# Patient Record
Sex: MALE | URBAN - METROPOLITAN AREA
[De-identification: names, ages, dates, MRNs, and addresses within clinical notes are randomized per-mention and may not be internally consistent; named-entity substitution may affect disease eponyms.]

---

## 2017-03-09 ENCOUNTER — MEDICAL CORRESPONDENCE (OUTPATIENT)
Dept: HEALTH INFORMATION MANAGEMENT | Facility: CLINIC | Age: 67
End: 2017-03-09

## 2017-03-09 ENCOUNTER — TRANSFERRED RECORDS (OUTPATIENT)
Dept: HEALTH INFORMATION MANAGEMENT | Facility: CLINIC | Age: 67
End: 2017-03-09

## 2017-03-13 ENCOUNTER — TRANSFERRED RECORDS (OUTPATIENT)
Dept: HEALTH INFORMATION MANAGEMENT | Facility: CLINIC | Age: 67
End: 2017-03-13

## 2017-03-15 ENCOUNTER — PRE VISIT (OUTPATIENT)
Dept: ORTHOPEDICS | Facility: CLINIC | Age: 67
End: 2017-03-15

## 2017-03-15 NOTE — TELEPHONE ENCOUNTER
1.  Date/reason for appt: 3/21/17 -- low back pain with right leg weakness  2.  Referring provider: Elvis Hughes  3.  Call to patient (Yes / No - short description): no, referred  4.  Previous care at / records requested from: [facility] -- faxed records request.

## 2017-03-15 NOTE — TELEPHONE ENCOUNTER
Records received from [facility]. Notified Brandi to pull image.   Included  Office notes: 3/9/17, 6/23/16   [facility] notes: 3/29/16, 1/20/16  Radiology reports: MR lumbar on 3/13/17    Missing/needed records: decompression cervical spine on 2/16/16, lumbar back surgery on 3/8/13

## 2017-03-16 NOTE — TELEPHONE ENCOUNTER
Per [facility], pt did not have surgery with them.     Called and spoke to pt, he remembers having low back surgery in 2000 at [facility] in [place].  Stated it was so long ago and doesn't quite remember clearly.  Pt asked that I fax GERMANIA form to his wife, Fern, at [phone].  He will sign the form and return back to me.

## 2017-03-20 NOTE — TELEPHONE ENCOUNTER
Records received from [facility].  Forwarded to clinic.  Included  Office notes: 7/18/14   Pain Clinic notes: 12/15/14, 2/16/15, 4/3/15, 8/3/15, 9/28/15, 12/4/15, 1/8/16, 2/5/16, 3/16/16, 5/11/16  Op/Procedure notes: Lumbar medial branch block L4-L5 done 6/23/15  Other: Labs 5/17/14  Missing/needed records: Op-notes for back surgery

## 2017-03-20 NOTE — TELEPHONE ENCOUNTER
Records received from [facility].   Included  Office notes: 6/23/10, 7/7/10, 8/4/10, 8/27/10, 9/27/10, 5/27/10, 8/15/14  ED notes: 5/7/14-5/10/14  Radiology reports: CT lumbar on 11/1/13   Lumbar on 12/14/01  Op/Procedure notes: L5 hemilaminectomy on 9/1999   Left foot realignment subtalar joint arthrodesis on 5/7/14 with implants

## 2017-03-20 NOTE — TELEPHONE ENCOUNTER
Received CD of imaging from , sent to film room.    OPS MBB Bilateral Lumbar 6/23/15  Pain Center Radiofrequency Ablation 11/5/14  Pain Center Medial Nerve Block 10/21/14, 10/7/14

## 2017-03-20 NOTE — TELEPHONE ENCOUNTER
Received VM from Ruthie at , stated they mailed CD via Fed-Ex on Friday and wanted to know if we received it.  Called Ruthie (at [phone]) and left a VM, we did receive CD but they didn't include XR or CT of Lspine, only injections.  Will need those images prior to appt.

## 2017-03-21 ENCOUNTER — OFFICE VISIT (OUTPATIENT)
Dept: ORTHOPEDICS | Facility: CLINIC | Age: 67
End: 2017-03-21

## 2017-03-21 VITALS — WEIGHT: 260 LBS | BODY MASS INDEX: 31.66 KG/M2 | HEIGHT: 76 IN

## 2017-03-21 DIAGNOSIS — M51.27 LUMBOSACRAL DISC HERNIATION: ICD-10-CM

## 2017-03-21 DIAGNOSIS — G89.29 CHRONIC RADICULAR LUMBAR PAIN: Primary | ICD-10-CM

## 2017-03-21 DIAGNOSIS — M54.16 CHRONIC RADICULAR LUMBAR PAIN: Primary | ICD-10-CM

## 2017-03-21 ASSESSMENT — ENCOUNTER SYMPTOMS
MYALGIAS: 1
JOINT SWELLING: 0
SKIN CHANGES: 0
ALTERED TEMPERATURE REGULATION: 0
WEIGHT LOSS: 0
POOR WOUND HEALING: 0
FEVER: 0
CHILLS: 0
HALLUCINATIONS: 0
BACK PAIN: 1
NECK PAIN: 1
FATIGUE: 1
STIFFNESS: 1
NAIL CHANGES: 0
WEIGHT GAIN: 1
POLYPHAGIA: 0
NIGHT SWEATS: 1
INCREASED ENERGY: 0
DECREASED APPETITE: 0
MUSCLE WEAKNESS: 0
ARTHRALGIAS: 0
POLYDIPSIA: 0
MUSCLE CRAMPS: 0

## 2017-03-21 NOTE — NURSING NOTE
"Reason For Visit:   Chief Complaint   Patient presents with     Musculoskeletal Problem     Chronic low back pain, recent acute pain from cruise 3 weeks ago, referred by PCP Dr Mckeon, MRI in PACs     Age: 66 year old    Occupation: Retired  Currently working? No, former .    Date of injury: Acute event recently while on cruise    Date of surgery: 2001 - laminectomy    Smoker: No    Pain Assessment  Patient Currently in Pain: Yes  Primary Pain Location: Back  Alleviating Factors: Exercise (Tries to go to gym often, enjoys biking/basketball)  Aggravating Factors: Sitting, Lying    Ht 1.93 m (6' 4\")  Wt 117.9 kg (260 lb)  BMI 31.65 kg/m2                                                   "

## 2017-03-21 NOTE — PROGRESS NOTES
HISTORY OF PRESENT ILLNESS  Mr. Soria is a pleasant 66 year old year old male who presents to clinic today with low back pain for 17 years  Had back surgery laminectomy in 2001 that helped but has gotten worse again.   Has had radiofrequency ablation done about 4 times, most recently in Jan. Of this year.     Location: low back, bilaterally  Quality:  achy pain    Severity: 10/10 at worst    Duration: 'years'  Timing: occurs daily  Context: occurs while sitting and standing for long periods  Modifying factors:  resting and non-use makes it better, movement and use makes it worse  Associated signs & symptoms: no radiation into his legs, but feels some weakness in right leg at times    Previous similar pain: yes  Exercise: lifting weights and biking  Additional history: as documented    MEDICAL HISTORY  Patient Active Problem List   Diagnosis     Nausea without vomiting       Current Outpatient Prescriptions   Medication Sig Dispense Refill     GABAPENTIN 800 mg 3xqd       GEMFIBROZIL 600 mg 2xqd       HYDROCHLOROTHIAZIDE PO Take  by mouth. 25 mg 1xqd       Valsartan (DIOVAN PO) Take  by mouth. 160 mg 1xqd       SIMVASTATIN PO Take  by mouth. 80 mg 1xqd       ALLOPURINOL 100 mg        ASPIRIN 81 mg        FISH OIL 1400 mg        ALPHA-LIPOIC ACID 200 mg 3 cap per day       Multiple Vitamins-Minerals (B COMPLEX PLUS VITAMIN C PO) Take  by mouth. 1 per day       Cholecalciferol (VITAMIN D PO) Take  by mouth. 1 per day       IBUPROFEN 800 mg prn         No Known Allergies    No family history on file.    Additional medical/Social/Surgical histories reviewed in [facility] and updated as appropriate.     REVIEW OF SYSTEMS (3/21/2017)  10 point ROS of systems including Constitutional, Eyes, Respiratory, Cardiovascular, Gastroenterology, Genitourinary, Integumentary, Musculoskeletal, Psychiatric were all negative except for pertinent positives noted in my HPI.     PHYSICAL EXAM  Vitals:    03/21/17 1416   Weight: 117.9 kg (260 lb)  "  Height: 1.93 m (6' 4\")     Vital Signs: Ht 1.93 m (6' 4\")  Wt 117.9 kg (260 lb)  BMI 31.65 kg/m2 Patient declined being weighed. Body mass index is 31.65 kg/(m^2).    General  - normal appearance, in no obvious distress  CV  - normal peripheral perfusion  Pulm  - normal respiratory pattern, non-labored  Musculoskeletal - lumbar spine  - stance: normal gait without limp, no obvious leg length discrepancy, normal heel and toe walk  - inspection: normal bone and joint alignment, no obvious scoliosis  - palpation: no paravertebral or bony tenderness  - ROM: flexion exacerbates pain in right low back, normal extension, sidebending, rotation  - strength: lower extremities 5/5 in all planes  - special tests:  (+) straight leg raise- right low back  (+) slump test- low back  Neuro  - patellar and Achilles DTRs 2+ bilaterally, right lower extremity sensory deficit throughout L5 distribution, grossly normal coordination, normal muscle tone  Skin  - no ecchymosis, erythema, warmth, or induration, no obvious rash  Psych  - interactive, appropriate, normal mood and affect    ASSESSMENT & PLAN  67 yo male with low back pain that radiates at times into right leg due to lumbar disc herniation and DDD  -due to history of surgeries and ablations patient desires a surgical opinion- referred to prieto  -orderd a lumbar JEFFREY at L5/s1  -discussed start of tizanadine and tylenol  Restart HEP from PT  Cont. Exercising  F/u after JEFFREY    Ozzy Du MD, CAQSM    "

## 2017-03-21 NOTE — TELEPHONE ENCOUNTER
Called [facility] and spoke to Erin, they no longer have CT Lspine 11/1/13 or XR Lspine 12/14/01.  They do have an XR Lspine from 2013. She will put image on CD.  Sent  to .

## 2017-03-21 NOTE — MR AVS SNAPSHOT
After Visit Summary   3/21/2017    Silas Soria    MRN: [id number]           Patient Information     Date Of Birth          1950        Visit Information        Provider Department      3/21/2017 2:00 PM Ozzy Du MD [facility]        Today's Diagnoses     Chronic radicular lumbar pain    -  1    Lumbosacral disc herniation           Follow-ups after your visit        Additional Services     SPINE SURGERY REFERRAL       Dr Perea                  Future tests that were ordered for you today     Open Future Orders        Priority Expected Expires Ordered    X-ray Lumbar epidural injection Routine 3/21/2017 3/21/2018 3/21/2017            Who to contact     Please call your clinic at [phone] to:    Ask questions about your health    Make or cancel appointments    Discuss your medicines    Learn about your test results    Speak to your doctor   If you have compliments or concerns about an experience at your clinic, or if you wish to file a complaint, please contact [facility] Patient Relations at [phone] or email us at [email or web address]         Additional Information About Your Visit        MyChart Information     [employer] is an electronic gateway that provides easy, online access to your medical records. With [employer], you can request a clinic appointment, read your test results, renew a prescription or communicate with your care team.     To sign up for [employer] visit the website at [email or web address]   You will be asked to enter the access code listed below, as well as some personal information. Please follow the directions to create your username and password.     Your access code is: [email or web address]  Expires: 2017  6:30 AM     Your access code will  in 90 days. If you need help or a new code, please contact your [facility] Clinic or call [phone] for assistance.        Care  "EveryWhere ID     This is your Care EveryWhere ID. This could be used by other organizations to access your [facility] medical records  [id number]        Your Vitals Were     Height BMI (Body Mass Index)                1.93 m (6' 4\") 31.65 kg/m2           Blood Pressure from Last 3 Encounters:   09/18/12 115/76   09/12/12 142/107   08/22/12 133/84    Weight from Last 3 Encounters:   03/21/17 117.9 kg (260 lb)   09/18/12 110.2 kg (243 lb)   09/12/12 106.6 kg (235 lb)              We Performed the Following     SPINE SURGERY REFERRAL          Today's Medication Changes          These changes are accurate as of: 3/21/17  2:42 PM.  If you have any questions, ask your nurse or doctor.               Start taking these medicines.        Dose/Directions    tiZANidine 4 MG tablet   Commonly known as:  ZANAFLEX   Used for:  Chronic radicular lumbar pain        Dose:  4-8 mg   Take 1-2 tablets (4-8 mg) by mouth nightly as needed   Quantity:  30 tablet   Refills:  1         These medicines have changed or have updated prescriptions.        Dose/Directions    ALLOPURINOL   This may have changed:  Another medication with the same name was removed. Continue taking this medication, and follow the directions you see here.        100 mg   Refills:  0         Stop taking these medicines if you haven't already. Please contact your care team if you have questions.     GLIPIZIDE PO           OMEPRAZOLE PO           ondansetron 8 MG tablet   Commonly known as:  ZOFRAN           ONGLYZA 5 MG Tabs tablet   Generic drug:  saxagliptin           oxyCODONE-acetaminophen 5-325 MG per tablet   Commonly known as:  PERCOCET                Where to get your medicines      These medications were sent to [facility] [address] [place] [address] [address] [address]     Phone:  [phone]     tiZANidine 4 MG tablet                Primary Care Provider    Physician No Ref-Primary       No address on file        Thank " you!     Thank you for choosing [facility]  for your care. Our goal is always to provide you with excellent care. Hearing back from our patients is one way we can continue to improve our services. Please take a few minutes to complete the written survey that you may receive in the mail after your visit with us. Thank you!             Your Updated Medication List - Protect others around you: Learn how to safely use, store and throw away your medicines at www.disposemymeds.org.          This list is accurate as of: 3/21/17  2:42 PM.  Always use your most recent med list.                   Brand Name Dispense Instructions for use    ALLOPURINOL      100 mg       ALPHA-LIPOIC ACID      200 mg 3 cap per day       ASPIRIN      81 mg       B COMPLEX PLUS VITAMIN C PO      Take  by mouth. 1 per day       DIOVAN PO      Take  by mouth. 160 mg 1xqd       FISH OIL      1400 mg       GABAPENTIN      800 mg 3xqd       GEMFIBROZIL      600 mg 2xqd       HYDROCHLOROTHIAZIDE PO      Take  by mouth. 25 mg 1xqd       IBUPROFEN      800 mg prn       SIMVASTATIN PO      Take  by mouth. 80 mg 1xqd       tiZANidine 4 MG tablet    ZANAFLEX    30 tablet    Take 1-2 tablets (4-8 mg) by mouth nightly as needed       VITAMIN D PO      Take  by mouth. 1 per day

## 2017-03-21 NOTE — LETTER
3/21/2017       RE: Silas Soria  [address] [facility] [address]     Dear Colleague,    Thank you for referring your patient, Silas Soria, to the [facility] at [facility]. Please see a copy of my visit note below.    HISTORY OF PRESENT ILLNESS  Mr. Soria is a pleasant 66 year old year old male who presents to clinic today with low back pain for 17 years  Had back surgery laminectomy in 2001 that helped but has gotten worse again.   Has had radiofrequency ablation done about 4 times, most recently in Jan. Of this year.     Location: low back, bilaterally  Quality:  achy pain    Severity: 10/10 at worst    Duration: 'years'  Timing: occurs daily  Context: occurs while sitting and standing for long periods  Modifying factors:  resting and non-use makes it better, movement and use makes it worse  Associated signs & symptoms: no radiation into his legs, but feels some weakness in right leg at times    Previous similar pain: yes  Exercise: lifting weights and biking  Additional history: as documented    MEDICAL HISTORY  Patient Active Problem List   Diagnosis     Nausea without vomiting       Current Outpatient Prescriptions   Medication Sig Dispense Refill     GABAPENTIN 800 mg 3xqd       GEMFIBROZIL 600 mg 2xqd       HYDROCHLOROTHIAZIDE PO Take  by mouth. 25 mg 1xqd       Valsartan (DIOVAN PO) Take  by mouth. 160 mg 1xqd       SIMVASTATIN PO Take  by mouth. 80 mg 1xqd       ALLOPURINOL 100 mg        ASPIRIN 81 mg        FISH OIL 1400 mg        ALPHA-LIPOIC ACID 200 mg 3 cap per day       Multiple Vitamins-Minerals (B COMPLEX PLUS VITAMIN C PO) Take  by mouth. 1 per day       Cholecalciferol (VITAMIN D PO) Take  by mouth. 1 per day       IBUPROFEN 800 mg prn         No Known Allergies    No family history on file.    Additional medical/Social/Surgical histories reviewed in [facility] and updated as appropriate.     REVIEW OF SYSTEMS (3/21/2017)  10 point ROS of systems  "including Constitutional, Eyes, Respiratory, Cardiovascular, Gastroenterology, Genitourinary, Integumentary, Musculoskeletal, Psychiatric were all negative except for pertinent positives noted in my HPI.     PHYSICAL EXAM  Vitals:    03/21/17 1416   Weight: 117.9 kg (260 lb)   Height: 1.93 m (6' 4\")     Vital Signs: Ht 1.93 m (6' 4\")  Wt 117.9 kg (260 lb)  BMI 31.65 kg/m2 Patient declined being weighed. Body mass index is 31.65 kg/(m^2).    General  - normal appearance, in no obvious distress  CV  - normal peripheral perfusion  Pulm  - normal respiratory pattern, non-labored  Musculoskeletal - lumbar spine  - stance: normal gait without limp, no obvious leg length discrepancy, normal heel and toe walk  - inspection: normal bone and joint alignment, no obvious scoliosis  - palpation: no paravertebral or bony tenderness  - ROM: flexion exacerbates pain in right low back, normal extension, sidebending, rotation  - strength: lower extremities 5/5 in all planes  - special tests:  (+) straight leg raise- right low back  (+) slump test- low back  Neuro  - patellar and Achilles DTRs 2+ bilaterally, right lower extremity sensory deficit throughout L5 distribution, grossly normal coordination, normal muscle tone  Skin  - no ecchymosis, erythema, warmth, or induration, no obvious rash  Psych  - interactive, appropriate, normal mood and affect    ASSESSMENT & PLAN  67 yo male with low back pain that radiates at times into right leg due to lumbar disc herniation and DDD  -due to history of surgeries and ablations patient desires a surgical opinion- referred to prieto  -orderd a lumbar JEFFREY at L5/s1  -discussed start of tizanadine and tylenol  Restart HEP from PT  Cont. Exercising  F/u after JEFFREY    Ozzy Du MD, CAQSM      "

## 2017-03-22 ENCOUNTER — TELEPHONE (OUTPATIENT)
Dept: GENERAL RADIOLOGY | Facility: CLINIC | Age: 67
End: 2017-03-22

## 2017-06-14 ENCOUNTER — PRE VISIT (OUTPATIENT)
Dept: ORTHOPEDICS | Facility: CLINIC | Age: 67
End: 2017-06-14

## 2017-06-14 NOTE — TELEPHONE ENCOUNTER
1.  Date/reason for appt: 6/22/17 - Chronic Lumbar Pain  2.  Referring provider: Dr. Du  3.  Call to patient (Yes / No - short description): no, pt is referred  4.  Previous care at / records requested from:   - [facility] -- Records and XR L Spine 3/30/17 in [employer]   - [email or web address] received, will forward to clinic. Imaging in [facility].   - [facility] -- Records received, will forward to clinic, imaging in Pac[facility].

## 2017-06-16 NOTE — TELEPHONE ENCOUNTER
Spoke to pt -- he completed his most recent radiofrequency ablation at [facility]. Only completed one here.    All others were done at [facility].    Will fax request request to [facility] for additional records, and will fax request to [facility].

## 2017-06-16 NOTE — TELEPHONE ENCOUNTER
LVM for pt to see where radiofrequency ablation was done. There is one in Pacs from [facility] 11/5/14.

## 2017-06-19 NOTE — TELEPHONE ENCOUNTER
Records received from [facility] - will forward to clinic.    Progress notes: 7/19/16, 1/25/17  Other: Bilat L3/4/5 Radiofrequency Ablation 12/8/16 with Dr. Resendiz

## 2017-06-21 DIAGNOSIS — M54.9 BACK PAIN, UNSPECIFIED BACK LOCATION, UNSPECIFIED BACK PAIN LATERALITY, UNSPECIFIED CHRONICITY: Primary | ICD-10-CM

## 2017-06-22 ENCOUNTER — OFFICE VISIT (OUTPATIENT)
Dept: ORTHOPEDICS | Facility: CLINIC | Age: 67
End: 2017-06-22

## 2017-06-22 VITALS — BODY MASS INDEX: 32.16 KG/M2 | HEIGHT: 76 IN | WEIGHT: 264.1 LBS

## 2017-06-22 DIAGNOSIS — M51.369 DDD (DEGENERATIVE DISC DISEASE), LUMBAR: Primary | ICD-10-CM

## 2017-06-22 ASSESSMENT — ENCOUNTER SYMPTOMS
FEVER: 0
POLYPHAGIA: 0
ARTHRALGIAS: 0
MUSCLE WEAKNESS: 0
MUSCLE CRAMPS: 0
NECK PAIN: 1
CHILLS: 0
WEIGHT GAIN: 0
DECREASED APPETITE: 0
BACK PAIN: 1
FATIGUE: 1
WEIGHT LOSS: 0
STIFFNESS: 0
NIGHT SWEATS: 1
JOINT SWELLING: 0
MYALGIAS: 1
NUMBNESS: 0

## 2017-06-22 NOTE — PROGRESS NOTES
REASON FOR CONSULTATION: Consult (Chronic lumbar pain. Hx of L5 hemilaminectomy with lateral recess decompression and root decompression 1999. )     REFERRING PHYSICIAN: Ozzy Du     PRIMARY CARE PHYSICIAN: No Ref-Primary, Physician    HISTORY OF PRESENT ILLNESS: Silas Soria is a 66 year old male with h/o back surgery laminectomy in 1999 (Dr. Joy) who presents with low back pain for the past 17 years. Pain is described as achy but will be sharp at times with bending over or twisting. Alleviating factors include: resting, sleeping on side, and ibuprofen prn. Was on Percocet 7.5mg 3x qday since surgery until 3 months ago. Aggravating factors include: sitting and standing for long periods. Denies radiation in the legs. Patient had radiofrequency ablation ~4 times and this would sometimes alleviate a 'pressure sensation' for up to year. Has been to a chiropactor. Had a lower back injection Jan/Feb. This helped for ~2 months.      REVIEW OF SYSTEMS: A 12-point review of systems was completed and is negative except for otherwise noted above in the history of present illness.    Past medical, past surgical, social, family history, medications, and allergies reviewed on the orthopaedic surgery intake form which is scanned into the electronic record with pertinent positives commented on.    No Known Allergies    Past Medical History:   Diagnosis Date     Arthritis      Diabetes mellitus (H)      Hypertension        Past Surgical History:   Procedure Laterality Date     BACK SURGERY      herniated disk     COLONOSCOPY       ESOPHAGOSCOPY, GASTROSCOPY, DUODENOSCOPY (EGD), COMBINED  9/12/2012    Procedure: COMBINED ESOPHAGOSCOPY, GASTROSCOPY, DUODENOSCOPY (EGD), BIOPSY SINGLE OR MULTIPLE;;  Surgeon: Tita Phan MD;  Location:  GI     ORTHOPEDIC SURGERY      fingers, wrist, shoulder, ankle       No family history on file.    Social History   Substance Use Topics     Smoking status: Former Smoker      Quit date: 1989     Smokeless tobacco: Former User     Types: Chew     Alcohol use Yes      Comment: occ beer once a month       Current Outpatient Prescriptions   Medication     tiZANidine (ZANAFLEX) 4 MG tablet     GABAPENTIN     GEMFIBROZIL     HYDROCHLOROTHIAZIDE PO     Valsartan (DIOVAN PO)     SIMVASTATIN PO     ALLOPURINOL     ASPIRIN     FISH OIL     ALPHA-LIPOIC ACID     Multiple Vitamins-Minerals (B COMPLEX PLUS VITAMIN C PO)     Cholecalciferol (VITAMIN D PO)     IBUPROFEN     No current facility-administered medications for this visit.        PHYSICAL EXAM:   Constitutional - Patient is healthy, well-nourished and appears stated age.    BMI = 32    Respiratory - Patient is breathing normally and in no respiratory distress.   Skin - No suspicious rashes or lesions.   Psychiatric - Normal mood and affect.   Cardiovascular - Peripheral pulses are normal.   ENT - Hearing intact to the spoken word.   GI - No abdominal distention.   Musculoskeletal - Non-antalgic gait without use of assistive devices.                 Lumbar Spine:    Appearance - Normal     Palpation - TTP in lower lumbar spine    ROM - pain with lateral flexion. Full ROM to ext/flex.    Motor -        LOWER EXTREMITY Left Right   Hip flexion 5/5 5/5   Knee flexion 5/5 5/5   Knee extension 5/5 5/5   Ankle dorsiflexion 5/5 5/5   Ankle plantarflexion 5/5 5/5   Great toe extension 5/5 5/5   Great toe flexion 5/5 5/5         Neurologic - Sensation intact to light touch bilaterally      IMAGIN17 full spine 2v  Advanced spondylosis/DDD of L5-S1    CLINICAL ASSESSMENT:   1. 67 y/o M with history of laminectomy in  with XRs today consistent with advanced spondylosis/DDD of L5-S1.     DISCUSSION/PLAN:   Patient tried a wide variety of conservative treatment options, such as radiofrequency ablation, oral analgesics, and injections. Patient counseled on further conservative management (injections 2-3x/year) vs surgery (ALIF with  anterior fixation of L5-S1).    1. L5-S1 translaminar epidural injection  2. Patient will call clinic after injection if symptoms return for subsequent injection vs planning for surgery.     Patient was seen and discussed with Dr. Perea.  Assessment and plan developed by him.  All questions and concerns were answered to the patient's apparent satisfaction before leaving the clinic.     Tanya Barreto MD  [facility] Resident - PGY1  Orthopedic Surgery      Addendum:  I personally saw and evaluated patient with PGY-1 Estevan, and I agree with findings and plan outlined in the note.  66/m, with postlaminectomy spondylosis/DDD L5-S1, s/p laminectomy (Dr. Joy, 1999).  Would like to continue with nonop treatment for now.  - Ordered L5-S1 IL JEFFREY.  - In future, may consider surgery: ALIF with anterior fixation L5-S1.  RTC prn.  TT > 45 mins, > 50% CC.

## 2017-06-22 NOTE — LETTER
6/22/2017      RE: Silas Soria  [address] [facility] [address]       REASON FOR CONSULTATION: Consult (Chronic lumbar pain. Hx of L5 hemilaminectomy with lateral recess decompression and root decompression 1999. )     REFERRING PHYSICIAN: Ozzy Du     PRIMARY CARE PHYSICIAN: No Ref-Primary, Physician    HISTORY OF PRESENT ILLNESS: Silas Soria is a 66 year old male with h/o back surgery laminectomy in 1999 (Dr. Joy) who presents with low back pain for the past 17 years. Pain is described as achy but will be sharp at times with bending over or twisting. Alleviating factors include: resting, sleeping on side, and ibuprofen prn. Was on Percocet 7.5mg 3x qday since surgery until 3 months ago. Aggravating factors include: sitting and standing for long periods. Denies radiation in the legs. Patient had radiofrequency ablation ~4 times and this would sometimes alleviate a 'pressure sensation' for up to year. Has been to a chiropactor. Had a lower back injection Jan/Feb. This helped for ~2 months.      REVIEW OF SYSTEMS: A 12-point review of systems was completed and is negative except for otherwise noted above in the history of present illness.    Past medical, past surgical, social, family history, medications, and allergies reviewed on the orthopaedic surgery intake form which is scanned into the electronic record with pertinent positives commented on.    No Known Allergies    Past Medical History:   Diagnosis Date     Arthritis      Diabetes mellitus (H)      Hypertension        Past Surgical History:   Procedure Laterality Date     BACK SURGERY      herniated disk     COLONOSCOPY       ESOPHAGOSCOPY, GASTROSCOPY, DUODENOSCOPY (EGD), COMBINED  9/12/2012    Procedure: COMBINED ESOPHAGOSCOPY, GASTROSCOPY, DUODENOSCOPY (EGD), BIOPSY SINGLE OR MULTIPLE;;  Surgeon: Tita Phan MD;  Location:  GI     ORTHOPEDIC SURGERY      fingers, wrist, shoulder, ankle       No family history on  file.    Social History   Substance Use Topics     Smoking status: Former Smoker     Quit date: 1989     Smokeless tobacco: Former User     Types: Chew     Alcohol use Yes      Comment: occ beer once a month       Current Outpatient Prescriptions   Medication     tiZANidine (ZANAFLEX) 4 MG tablet     GABAPENTIN     GEMFIBROZIL     HYDROCHLOROTHIAZIDE PO     Valsartan (DIOVAN PO)     SIMVASTATIN PO     ALLOPURINOL     ASPIRIN     FISH OIL     ALPHA-LIPOIC ACID     Multiple Vitamins-Minerals (B COMPLEX PLUS VITAMIN C PO)     Cholecalciferol (VITAMIN D PO)     IBUPROFEN     No current facility-administered medications for this visit.        PHYSICAL EXAM:   Constitutional - Patient is healthy, well-nourished and appears stated age.    BMI = 32    Respiratory - Patient is breathing normally and in no respiratory distress.   Skin - No suspicious rashes or lesions.   Psychiatric - Normal mood and affect.   Cardiovascular - Peripheral pulses are normal.   ENT - Hearing intact to the spoken word.   GI - No abdominal distention.   Musculoskeletal - Non-antalgic gait without use of assistive devices.                 Lumbar Spine:    Appearance - Normal     Palpation - TTP in lower lumbar spine    ROM - pain with lateral flexion. Full ROM to ext/flex.    Motor -        LOWER EXTREMITY Left Right   Hip flexion 5/5 5/5   Knee flexion 5/5 5/5   Knee extension 5/5 5/5   Ankle dorsiflexion 5/5 5/5   Ankle plantarflexion 5/5 5/5   Great toe extension 5/5 5/5   Great toe flexion 5/5 5/5         Neurologic - Sensation intact to light touch bilaterally      IMAGIN17 full spine 2v  Advanced spondylosis/DDD of L5-S1    CLINICAL ASSESSMENT:   1. 67 y/o M with history of laminectomy in  with XRs today consistent with advanced spondylosis/DDD of L5-S1.     DISCUSSION/PLAN:   Patient tried a wide variety of conservative treatment options, such as radiofrequency ablation, oral analgesics, and injections. Patient counseled on  further conservative management (injections 2-3x/year) vs surgery (ALIF with anterior fixation of L5-S1).    1. L5-S1 translaminar epidural injection  2. Patient will call clinic after injection if symptoms return for subsequent injection vs planning for surgery.     Patient was seen and discussed with Dr. Perea.  Assessment and plan developed by him.  All questions and concerns were answered to the patient's apparent satisfaction before leaving the clinic.     Tanya Barreto MD  [facility] Resident - PGY1  Orthopedic Surgery      Addendum:  I personally saw and evaluated patient with PGY-1 Estevan, and I agree with findings and plan outlined in the note.  66/m, with postlaminectomy spondylosis/DDD L5-S1, s/p laminectomy (Dr. Joy, 1999).  Would like to continue with nonop treatment for now.  - Ordered L5-S1 IL JEFFREY.  - In future, may consider surgery: ALIF with anterior fixation L5-S1.  RTC prn.  TT > 45 mins, > 50% CC.    Slava Perea MD

## 2017-06-22 NOTE — NURSING NOTE
SAFETY INJECTION WORKSHEET    Epidural Steriod Injection: lumbar spine  Are you on blood thinners or platelet inhibitors? No  Are you a diabetic? Yes  Are you allergic to dye? Not allergic but had a reaction to his kidney function.   Are you currently on antibiotics? No  Weight? Wt Readings from Last 1 Encounters:  06/22/17 : 119.8 kg (264 lb 1.6 oz)    Have you had a flu shot within the last 10 days? No

## 2017-06-22 NOTE — LETTER
6/22/2017       RE: Silas Soria  [address] [facility] [address]     Dear Colleague,    Thank you for referring your patient, Silas Soria, to the [facility] at [facility]. Please see a copy of my visit note below.    REASON FOR CONSULTATION: Consult (Chronic lumbar pain. Hx of L5 hemilaminectomy with lateral recess decompression and root decompression 1999. )     REFERRING PHYSICIAN: Ozzy Du     PRIMARY CARE PHYSICIAN: No Ref-Primary, Physician    HISTORY OF PRESENT ILLNESS: Silas Soria is a 66 year old male with h/o back surgery laminectomy in 1999 (Dr. Joy) who presents with low back pain for the past 17 years. Pain is described as achy but will be sharp at times with bending over or twisting. Alleviating factors include: resting, sleeping on side, and ibuprofen prn. Was on Percocet 7.5mg 3x qday since surgery until 3 months ago. Aggravating factors include: sitting and standing for long periods. Denies radiation in the legs. Patient had radiofrequency ablation ~4 times and this would sometimes alleviate a 'pressure sensation' for up to year. Has been to a chiropactor. Had a lower back injection Jan/Feb. This helped for ~2 months.      REVIEW OF SYSTEMS: A 12-point review of systems was completed and is negative except for otherwise noted above in the history of present illness.    Past medical, past surgical, social, family history, medications, and allergies reviewed on the orthopaedic surgery intake form which is scanned into the electronic record with pertinent positives commented on.    No Known Allergies    Past Medical History:   Diagnosis Date     Arthritis      Diabetes mellitus (H)      Hypertension        Past Surgical History:   Procedure Laterality Date     BACK SURGERY      herniated disk     COLONOSCOPY       ESOPHAGOSCOPY, GASTROSCOPY, DUODENOSCOPY (EGD), COMBINED  9/12/2012    Procedure: COMBINED ESOPHAGOSCOPY, GASTROSCOPY,  DUODENOSCOPY (EGD), BIOPSY SINGLE OR MULTIPLE;;  Surgeon: Tita Phan MD;  Location:  GI     ORTHOPEDIC SURGERY      fingers, wrist, shoulder, ankle       No family history on file.    Social History   Substance Use Topics     Smoking status: Former Smoker     Quit date: 1989     Smokeless tobacco: Former User     Types: Chew     Alcohol use Yes      Comment: occ beer once a month       Current Outpatient Prescriptions   Medication     tiZANidine (ZANAFLEX) 4 MG tablet     GABAPENTIN     GEMFIBROZIL     HYDROCHLOROTHIAZIDE PO     Valsartan (DIOVAN PO)     SIMVASTATIN PO     ALLOPURINOL     ASPIRIN     FISH OIL     ALPHA-LIPOIC ACID     Multiple Vitamins-Minerals (B COMPLEX PLUS VITAMIN C PO)     Cholecalciferol (VITAMIN D PO)     IBUPROFEN     No current facility-administered medications for this visit.        PHYSICAL EXAM:   Constitutional - Patient is healthy, well-nourished and appears stated age.    BMI = 32    Respiratory - Patient is breathing normally and in no respiratory distress.   Skin - No suspicious rashes or lesions.   Psychiatric - Normal mood and affect.   Cardiovascular - Peripheral pulses are normal.   ENT - Hearing intact to the spoken word.   GI - No abdominal distention.   Musculoskeletal - Non-antalgic gait without use of assistive devices.                 Lumbar Spine:    Appearance - Normal     Palpation - TTP in lower lumbar spine    ROM - pain with lateral flexion. Full ROM to ext/flex.    Motor -        LOWER EXTREMITY Left Right   Hip flexion 5/5 5/5   Knee flexion 5/5 5/5   Knee extension 5/5 5/5   Ankle dorsiflexion 5/5 5/5   Ankle plantarflexion 5/5 5/5   Great toe extension 5/5 5/5   Great toe flexion 5/5 5/5       Neurologic - Sensation intact to light touch bilaterally      IMAGIN17 full spine 2v  Advanced spondylosis/DDD of L5-S1    CLINICAL ASSESSMENT:   1. 65 y/o M with history of laminectomy in  with XRs today consistent with advanced  spondylosis/DDD of L5-S1.     DISCUSSION/PLAN:   Patient tried a wide variety of conservative treatment options, such as radiofrequency ablation, oral analgesics, and injections. Patient counseled on further conservative management (injections 2-3x/year) vs surgery (ALIF with anterior fixation of L5-S1).    1. L5-S1 translaminar epidural injection  2. Patient will call clinic after injection if symptoms return for subsequent injection vs planning for surgery.     Patient was seen and discussed with Dr. Perea.  Assessment and plan developed by him.  All questions and concerns were answered to the patient's apparent satisfaction before leaving the clinic.     Tanya Barreto MD  [facility] Resident - PGY1  Orthopedic Surgery      Addendum:  I personally saw and evaluated patient with PGY-1 Estevan, and I agree with findings and plan outlined in the note.  66/m, with postlaminectomy spondylosis/DDD L5-S1, s/p laminectomy (Dr. Joy, 1999).  Would like to continue with nonop treatment for now.  - Ordered L5-S1 IL JEFFREY.  - In future, may consider surgery: ALIF with anterior fixation L5-S1.  RTC prn.  TT > 45 mins, > 50% CC.      Again, thank you for allowing me to participate in the care of your patient.      Sincerely,    Slava Perea MD

## 2017-06-22 NOTE — MR AVS SNAPSHOT
After Visit Summary   6/22/2017    Silas Soria    MRN: [id number]           Patient Information     Date Of Birth          1950        Visit Information        Provider Department      6/22/2017 1:00 PM Slava Perea MD [facility]        Today's Diagnoses     DDD (degenerative disc disease), lumbar    -  1       Follow-ups after your visit        Your next 10 appointments already scheduled     Jun 26, 2017 10:00 AM CDT   XR LUMBAR TRANSLAMINAR INJECTION with UCXR3,  IMAGING NURSE,  NEURO RAD   [facility] ([facility])    [address] [address] [phone]           For nerve root injection, please send or bring copies of any MRIs or other scans you have had.  Bring a list of your current medicines to your exam. (Include vitamins, minerals and over-the-counter medicines.) Leave your valuables at home.  Plan to have someone drive you home afterward.  Stop taking the following medicines (but talk to your doctor first):   If you take blood thinners, you may need to stop taking them a few days before treatment. Talk to your doctor before stopping these medicines.Stop taking Coumadin (warfarin) 3 days before treatment. Restart the day after treatment.   If you take Plavix, Ticlid, Pletal or Persantine, please ask your doctor if you should stop these medicines. You may need extra tests on the morning of your scan. You may take your other medicines as normal.  Stop all food and drink (including water) 3 hours before your test or treatment.  Please tell the doctor:   If you are allergic to X-ray dye (contrast fluid).   If you may be pregnant.  Injections take about 30 to 45 minutes. Most people spend up to 2 hours in the clinic or hospital.  Please call the Imaging Department at your exam site with any questions              Future tests that were ordered for you today     Open Future Orders      "   Priority Expected Expires Ordered    X-ray Translaminar lumbar single inj Routine  6/22/2018 6/22/2017            Who to contact     Please call your clinic at [phone] to:    Ask questions about your health    Make or cancel appointments    Discuss your medicines    Learn about your test results    Speak to your doctor   If you have compliments or concerns about an experience at your clinic, or if you wish to file a complaint, please contact [facility] Patient Relations at [phone] or email us at [email or web address]         Additional Information About Your Visit        [employer] Information     [employer] gives you secure access to your electronic health record. If you see a primary care provider, you can also send messages to your care team and make appointments. If you have questions, please call your primary care clinic.  If you do not have a primary care provider, please call [phone] and they will assist you.      [employer] bio is an electronic gateway that provides easy, online access to your medical records. With [employer] bio, you can request a clinic appointment, read your test results, renew a prescription or communicate with your care team.     To access your existing account, please contact your [facility] or call [phone] for assistance.        Care EveryWhere ID     This is your Care EveryWhere ID. This could be used by other organizations to access your [place] medical records  [id number]        Your Vitals Were     Height BMI (Body Mass Index)                1.93 m (6' 4\") 32.15 kg/m2           Blood Pressure from Last 3 Encounters:   03/30/17 162/78   09/18/12 115/76   09/12/12 142/107    Weight from Last 3 Encounters:   06/22/17 119.8 kg (264 lb 1.6 oz)   03/21/17 117.9 kg (260 lb)   09/18/12 110.2 kg (243 lb)               Primary Care Provider    Physician No Ref-Primary       No address on file        Equal Access to " Services     [facility]: Hadii carina grant bogdan Villedaali, waaxda luqadaha, qaybta kaalmada karina, chelsea middleton . So [facility] [phone].    ATENCIÓN: Si fernandola magalis, tiene a anton disposición servicios gratuitos de asistencia lingüística. Llame al [phone].    We comply with applicable federal civil rights laws and [place] laws. We do not discriminate on the basis of race, color, national origin, age, disability sex, sexual orientation or gender identity.            Thank you!     Thank you for choosing [facility]  for your care. Our goal is always to provide you with excellent care. Hearing back from our patients is one way we can continue to improve our services. Please take a few minutes to complete the written survey that you may receive in the mail after your visit with us. Thank you!             Your Updated Medication List - Protect others around you: Learn how to safely use, store and throw away your medicines at www.disposemymeds.org.          This list is accurate as of: 6/22/17  3:44 PM.  Always use your most recent med list.                   Brand Name Dispense Instructions for use Diagnosis    ALLOPURINOL      100 mg        ALPHA-LIPOIC ACID      200 mg 3 cap per day        ASPIRIN      81 mg        B COMPLEX PLUS VITAMIN C PO      Take  by mouth. 1 per day        DIOVAN PO      Take  by mouth. 160 mg 1xqd        FISH OIL      1400 mg        GABAPENTIN      800 mg 3xqd        GEMFIBROZIL      600 mg 2xqd        HYDROCHLOROTHIAZIDE PO      Take  by mouth. 25 mg 1xqd        IBUPROFEN      800 mg prn        SIMVASTATIN PO      Take  by mouth. 80 mg 1xqd        tiZANidine 4 MG tablet    ZANAFLEX    30 tablet    Take 1-2 tablets (4-8 mg) by mouth nightly as needed    Chronic radicular lumbar pain       VITAMIN D PO      Take  by mouth. 1 per day

## 2017-06-22 NOTE — NURSING NOTE
"Reason For Visit:   Chief Complaint   Patient presents with     Consult     Chronic lumbar pain. Hx of L5 hemilaminectomy with lateral recess decompression and root decompression 1999. Had radiofrequency ablation 1.5 years ago which was effective 6 months or longer. Last ablation only lasted 1 month or so.        Primary MD: Dr Mckeon  Ref. MD: Dr Long    ?  No  Currently working? No.  Date of injury: 1999  Type of injury: playing foot ball..  Date of surgery: 1999  Type of surgery: of L5 hemilaminectomy with lateral recess decompression and root decompression .  Smoker: No      Ht 1.93 m (6' 4\")  Wt 119.8 kg (264 lb 1.6 oz)  BMI 32.15 kg/m2    Pain Assessment  Patient Currently in Pain: Yes  0-10 Pain Scale: 6  Primary Pain Location: Back  Pain Orientation: Lower  Pain Descriptors: Constant, Aching, Stabbing  Alleviating Factors:  (Lying on side, exercise)  Aggravating Factors: Bending (Lifting)    Oswestry (SHANTELLE) Questionnaire    OSWESTRY DISABILITY INDEX 6/22/2017   SECTION 1-PAIN INTENSITY 3  The pain is fairly severe at the moment.   SECTION 2-PERSONAL CARE (WASHING,DRESSING,ETC.) 1  I can look after myself normally but it is very painful.   SECTION 3-LIFTING 0  I can lift heavy weights without additional pain.   SECTION 4-WALKING 1  Pain prevents me from walking more than one mile.   SECTION 5-SITTING 2  Pain prevents me from sitting for more than 1 hour   SECTION 6-STANDING 1  I can stand as long as I want but it gives me additional pain.   SECTION 7-SLEEPING 2  Because of pain I have less than 6 hours sleep.   SECTION 8-SEX LIFE (IF APPLICABLE) 4  My sex life is nearly non existent because of pain.   SECTION 9-SOCIAL LIFE 3  Pain has restricted my social life and I do not go out as often.   SECTION 10-TRAVELING 2  Pain is bad but I am able to manage trips over two hours.   Oswestry Disability Index: Count 10   SHANTELLE: Total Score = SUM (total for answered questions) 19   Computed Oswestry Score " 38 (%)            Neck Disability Index (NDI) Questionnaire    No flowsheet data found.             Numeric Rating Scale:  VAS Scores     VAS Survey 6/22/2017   What is your level of back pain during the last week: 7.0   What is your level of RIGHT leg pain during the last week: 0   What is your level of LEFT leg pain during the last week: 0   What is your level of neck pain during the last week: 6.5   What is your level of RIGHT arm pain during the last week: 0   What is your level of LEFT arm pain during the last week: 5.0                Promis 10 Assessment    PROMIS 10 6/22/2017   In general, would you say your health is: Fair = 2   In general, would you say your quality of life is: Good = 3   In general, how would you rate your physical health? Fair = 2   In general, how would you rate your mental health, including your mood and your ability to think? Good = 3   In general, how would you rate your satisfaction with your social activities and relationships? Good = 3   In general, please rate how well you carry out your usual social activities and roles Very good = 4   To what extent are you able to carry out your everyday physical activities such as walking, climbing stairs, carrying groceries, or moving a chair? Moderately = 3   How often have you been bothered by emotional problems such as feeling anxious, depressed or irritable? Sometimes = 3   How would you rate your fatigue on average? Moderate = 3   How would you rate your pain on average?   0 = No Pain  to  10 = Worst Imaginable Pain 6   Global Physical Health Score : Raw Score 11 (SUM : G03 - G06 - G07 - G08)   Global Mentall Health Score : Raw Score 12 (SUM : G02 - G04 - G05 - G10)   Total (Physical + Mental Health Score) 23

## 2017-06-26 PROBLEM — M51.369 DDD (DEGENERATIVE DISC DISEASE), LUMBAR: Status: ACTIVE | Noted: 2017-06-26

## 2020-03-02 ENCOUNTER — HEALTH MAINTENANCE LETTER (OUTPATIENT)
Age: 70
End: 2020-03-02

## 2020-12-14 ENCOUNTER — HEALTH MAINTENANCE LETTER (OUTPATIENT)
Age: 70
End: 2020-12-14

## 2021-04-18 ENCOUNTER — HEALTH MAINTENANCE LETTER (OUTPATIENT)
Age: 71
End: 2021-04-18

## 2021-05-25 ENCOUNTER — RECORDS - HEALTHEAST (OUTPATIENT)
Dept: ADMINISTRATIVE | Facility: CLINIC | Age: 71
End: 2021-05-25

## 2021-05-27 ENCOUNTER — RECORDS - HEALTHEAST (OUTPATIENT)
Dept: ADMINISTRATIVE | Facility: CLINIC | Age: 71
End: 2021-05-27

## 2021-05-28 ENCOUNTER — RECORDS - HEALTHEAST (OUTPATIENT)
Dept: ADMINISTRATIVE | Facility: CLINIC | Age: 71
End: 2021-05-28

## 2021-05-30 ENCOUNTER — RECORDS - HEALTHEAST (OUTPATIENT)
Dept: ADMINISTRATIVE | Facility: CLINIC | Age: 71
End: 2021-05-30

## 2021-06-01 ENCOUNTER — RECORDS - HEALTHEAST (OUTPATIENT)
Dept: ADMINISTRATIVE | Facility: CLINIC | Age: 71
End: 2021-06-01

## 2021-06-02 ENCOUNTER — RECORDS - HEALTHEAST (OUTPATIENT)
Dept: ADMINISTRATIVE | Facility: CLINIC | Age: 71
End: 2021-06-02

## 2021-06-03 ENCOUNTER — RECORDS - HEALTHEAST (OUTPATIENT)
Dept: ADMINISTRATIVE | Facility: CLINIC | Age: 71
End: 2021-06-03

## 2021-06-04 ENCOUNTER — RECORDS - HEALTHEAST (OUTPATIENT)
Dept: ADMINISTRATIVE | Facility: CLINIC | Age: 71
End: 2021-06-04

## 2021-06-05 ENCOUNTER — RECORDS - HEALTHEAST (OUTPATIENT)
Dept: PALLIATIVE MEDICINE | Facility: OTHER | Age: 71
End: 2021-06-05

## 2021-06-05 DIAGNOSIS — M47.817 LUMBOSACRAL SPONDYLOSIS WITHOUT MYELOPATHY: ICD-10-CM

## 2021-10-02 ENCOUNTER — HEALTH MAINTENANCE LETTER (OUTPATIENT)
Age: 71
End: 2021-10-02

## 2022-05-14 ENCOUNTER — HEALTH MAINTENANCE LETTER (OUTPATIENT)
Age: 72
End: 2022-05-14

## 2022-09-03 ENCOUNTER — HEALTH MAINTENANCE LETTER (OUTPATIENT)
Age: 72
End: 2022-09-03

## 2023-06-03 ENCOUNTER — HEALTH MAINTENANCE LETTER (OUTPATIENT)
Age: 73
End: 2023-06-03

## 2024-06-05 NOTE — TELEPHONE ENCOUNTER
Records received from [facility].   Included  Radiology reports: [facility] medical branch on 6/23/15  Other: medial branch block on 1/6/12, 10/7/14, 10/21/14   Facet joint block on 8/1/13, 8/23/13   Radiofrequency  on 9/10/13, 3/26/12, 11/5/14   no